# Patient Record
Sex: MALE | Race: WHITE | Employment: FULL TIME | URBAN - METROPOLITAN AREA
[De-identification: names, ages, dates, MRNs, and addresses within clinical notes are randomized per-mention and may not be internally consistent; named-entity substitution may affect disease eponyms.]

---

## 2018-04-04 DIAGNOSIS — E78.5 HYPERLIPIDEMIA, UNSPECIFIED HYPERLIPIDEMIA TYPE: ICD-10-CM

## 2018-04-04 RX ORDER — ATORVASTATIN CALCIUM 40 MG/1
TABLET, FILM COATED ORAL
Qty: 90 TABLET | Refills: 3 | Status: SHIPPED | OUTPATIENT
Start: 2018-04-04 | End: 2018-11-19 | Stop reason: SDUPTHER

## 2018-11-19 ENCOUNTER — HOSPITAL ENCOUNTER (OUTPATIENT)
Age: 58
Discharge: HOME | End: 2018-11-21
Payer: COMMERCIAL

## 2018-11-19 ENCOUNTER — OFFICE VISIT (OUTPATIENT)
Dept: FAMILY MEDICINE CLINIC | Age: 58
End: 2018-11-19
Payer: COMMERCIAL

## 2018-11-19 VITALS
OXYGEN SATURATION: 97 % | HEART RATE: 72 BPM | RESPIRATION RATE: 16 BRPM | TEMPERATURE: 97.7 F | DIASTOLIC BLOOD PRESSURE: 85 MMHG | WEIGHT: 172 LBS | BODY MASS INDEX: 29.37 KG/M2 | SYSTOLIC BLOOD PRESSURE: 122 MMHG | HEIGHT: 64 IN

## 2018-11-19 DIAGNOSIS — E78.5 HYPERLIPIDEMIA, UNSPECIFIED HYPERLIPIDEMIA TYPE: ICD-10-CM

## 2018-11-19 DIAGNOSIS — Z11.59 ENCOUNTER FOR SCREENING FOR OTHER VIRAL DISEASES: Primary | ICD-10-CM

## 2018-11-19 DIAGNOSIS — K21.9 GASTROESOPHAGEAL REFLUX DISEASE WITHOUT ESOPHAGITIS: Chronic | ICD-10-CM

## 2018-11-19 DIAGNOSIS — Z11.59 ENCOUNTER FOR SCREENING FOR OTHER VIRAL DISEASES: ICD-10-CM

## 2018-11-19 LAB
ALBUMIN SERPL-MCNC: 4.3 G/DL (ref 3.5–5.2)
ALP BLD-CCNC: 82 U/L (ref 40–129)
ALT SERPL-CCNC: 52 U/L (ref 0–40)
ANION GAP SERPL CALCULATED.3IONS-SCNC: 14 MMOL/L (ref 7–16)
AST SERPL-CCNC: 31 U/L (ref 0–39)
BILIRUB SERPL-MCNC: 0.6 MG/DL (ref 0–1.2)
BUN BLDV-MCNC: 16 MG/DL (ref 6–20)
CALCIUM SERPL-MCNC: 9.3 MG/DL (ref 8.6–10.2)
CHLORIDE BLD-SCNC: 103 MMOL/L (ref 98–107)
CHOLESTEROL, TOTAL: 210 MG/DL (ref 0–199)
CO2: 25 MMOL/L (ref 22–29)
CREAT SERPL-MCNC: 1 MG/DL (ref 0.7–1.2)
GFR AFRICAN AMERICAN: >60
GFR NON-AFRICAN AMERICAN: >60 ML/MIN/1.73
GLUCOSE BLD-MCNC: 88 MG/DL (ref 74–99)
HDLC SERPL-MCNC: 34 MG/DL
LDL CHOLESTEROL CALCULATED: 102 MG/DL (ref 0–99)
POTASSIUM SERPL-SCNC: 4.4 MMOL/L (ref 3.5–5)
SODIUM BLD-SCNC: 142 MMOL/L (ref 132–146)
TOTAL PROTEIN: 7.3 G/DL (ref 6.4–8.3)
TRIGL SERPL-MCNC: 370 MG/DL (ref 0–149)
VLDLC SERPL CALC-MCNC: 74 MG/DL

## 2018-11-19 PROCEDURE — 99214 OFFICE O/P EST MOD 30 MIN: CPT | Performed by: FAMILY MEDICINE

## 2018-11-19 PROCEDURE — 99212 OFFICE O/P EST SF 10 MIN: CPT | Performed by: FAMILY MEDICINE

## 2018-11-19 PROCEDURE — 80061 LIPID PANEL: CPT

## 2018-11-19 PROCEDURE — 36415 COLL VENOUS BLD VENIPUNCTURE: CPT | Performed by: FAMILY MEDICINE

## 2018-11-19 PROCEDURE — 80053 COMPREHEN METABOLIC PANEL: CPT

## 2018-11-19 PROCEDURE — 86703 HIV-1/HIV-2 1 RESULT ANTBDY: CPT

## 2018-11-19 PROCEDURE — 86803 HEPATITIS C AB TEST: CPT

## 2018-11-19 RX ORDER — ATORVASTATIN CALCIUM 40 MG/1
TABLET, FILM COATED ORAL
Qty: 90 TABLET | Refills: 3 | Status: SHIPPED | OUTPATIENT
Start: 2018-11-19 | End: 2019-03-07 | Stop reason: SDUPTHER

## 2018-11-19 RX ORDER — OMEPRAZOLE 40 MG/1
40 CAPSULE, DELAYED RELEASE ORAL
Qty: 90 CAPSULE | Refills: 3 | Status: SHIPPED | OUTPATIENT
Start: 2018-11-19 | End: 2019-03-07 | Stop reason: SDUPTHER

## 2018-11-19 ASSESSMENT — PATIENT HEALTH QUESTIONNAIRE - PHQ9
SUM OF ALL RESPONSES TO PHQ QUESTIONS 1-9: 0
1. LITTLE INTEREST OR PLEASURE IN DOING THINGS: 0
SUM OF ALL RESPONSES TO PHQ QUESTIONS 1-9: 0
SUM OF ALL RESPONSES TO PHQ9 QUESTIONS 1 & 2: 0
2. FEELING DOWN, DEPRESSED OR HOPELESS: 0

## 2018-11-20 LAB
HEPATITIS C ANTIBODY INTERPRETATION: NORMAL
HIV-1 AND HIV-2 ANTIBODIES: NORMAL

## 2019-03-07 DIAGNOSIS — K21.9 GASTROESOPHAGEAL REFLUX DISEASE WITHOUT ESOPHAGITIS: Chronic | ICD-10-CM

## 2019-03-07 DIAGNOSIS — E78.5 HYPERLIPIDEMIA, UNSPECIFIED HYPERLIPIDEMIA TYPE: ICD-10-CM

## 2019-03-07 RX ORDER — ATORVASTATIN CALCIUM 40 MG/1
TABLET, FILM COATED ORAL
Qty: 90 TABLET | Refills: 3 | Status: SHIPPED
Start: 2019-03-07 | End: 2020-03-23 | Stop reason: SDUPTHER

## 2019-03-07 RX ORDER — OMEPRAZOLE 40 MG/1
40 CAPSULE, DELAYED RELEASE ORAL
Qty: 90 CAPSULE | Refills: 3 | Status: SHIPPED
Start: 2019-03-07 | End: 2020-03-23 | Stop reason: SDUPTHER

## 2019-10-17 ENCOUNTER — OFFICE VISIT (OUTPATIENT)
Dept: FAMILY MEDICINE CLINIC | Age: 59
End: 2019-10-17
Payer: COMMERCIAL

## 2019-10-17 ENCOUNTER — HOSPITAL ENCOUNTER (OUTPATIENT)
Age: 59
Discharge: HOME | End: 2019-10-19
Payer: COMMERCIAL

## 2019-10-17 VITALS
RESPIRATION RATE: 16 BRPM | SYSTOLIC BLOOD PRESSURE: 111 MMHG | BODY MASS INDEX: 27.14 KG/M2 | TEMPERATURE: 97.7 F | HEART RATE: 73 BPM | DIASTOLIC BLOOD PRESSURE: 76 MMHG | HEIGHT: 64 IN | OXYGEN SATURATION: 98 % | WEIGHT: 159 LBS

## 2019-10-17 DIAGNOSIS — E78.5 HYPERLIPIDEMIA, UNSPECIFIED HYPERLIPIDEMIA TYPE: Chronic | ICD-10-CM

## 2019-10-17 DIAGNOSIS — E78.5 HYPERLIPIDEMIA, UNSPECIFIED HYPERLIPIDEMIA TYPE: Primary | Chronic | ICD-10-CM

## 2019-10-17 DIAGNOSIS — R60.9 EDEMA, UNSPECIFIED TYPE: ICD-10-CM

## 2019-10-17 DIAGNOSIS — K21.9 GASTROESOPHAGEAL REFLUX DISEASE WITHOUT ESOPHAGITIS: Chronic | ICD-10-CM

## 2019-10-17 LAB
ALBUMIN SERPL-MCNC: 4.6 G/DL (ref 3.5–5.2)
ALP BLD-CCNC: 81 U/L (ref 40–129)
ALT SERPL-CCNC: 30 U/L (ref 0–40)
ANION GAP SERPL CALCULATED.3IONS-SCNC: 13 MMOL/L (ref 7–16)
AST SERPL-CCNC: 24 U/L (ref 0–39)
BILIRUB SERPL-MCNC: 0.6 MG/DL (ref 0–1.2)
BUN BLDV-MCNC: 21 MG/DL (ref 6–20)
CALCIUM SERPL-MCNC: 9.9 MG/DL (ref 8.6–10.2)
CHLORIDE BLD-SCNC: 102 MMOL/L (ref 98–107)
CHOLESTEROL, TOTAL: 146 MG/DL (ref 0–199)
CO2: 24 MMOL/L (ref 22–29)
CREAT SERPL-MCNC: 1 MG/DL (ref 0.7–1.2)
GFR AFRICAN AMERICAN: >60
GFR NON-AFRICAN AMERICAN: >60 ML/MIN/1.73
GLUCOSE BLD-MCNC: 97 MG/DL (ref 74–99)
HDLC SERPL-MCNC: 28 MG/DL
LDL CHOLESTEROL CALCULATED: 50 MG/DL (ref 0–99)
POTASSIUM SERPL-SCNC: 5.2 MMOL/L (ref 3.5–5)
SODIUM BLD-SCNC: 139 MMOL/L (ref 132–146)
TOTAL PROTEIN: 7.7 G/DL (ref 6.4–8.3)
TRIGL SERPL-MCNC: 339 MG/DL (ref 0–149)
VLDLC SERPL CALC-MCNC: 68 MG/DL

## 2019-10-17 PROCEDURE — 80053 COMPREHEN METABOLIC PANEL: CPT

## 2019-10-17 PROCEDURE — 80061 LIPID PANEL: CPT

## 2019-10-17 PROCEDURE — 99214 OFFICE O/P EST MOD 30 MIN: CPT | Performed by: FAMILY MEDICINE

## 2019-10-17 PROCEDURE — 36415 COLL VENOUS BLD VENIPUNCTURE: CPT

## 2019-10-17 PROCEDURE — 36415 COLL VENOUS BLD VENIPUNCTURE: CPT | Performed by: FAMILY MEDICINE

## 2019-10-17 PROCEDURE — 99212 OFFICE O/P EST SF 10 MIN: CPT | Performed by: FAMILY MEDICINE

## 2019-10-17 ASSESSMENT — PATIENT HEALTH QUESTIONNAIRE - PHQ9
1. LITTLE INTEREST OR PLEASURE IN DOING THINGS: 0
2. FEELING DOWN, DEPRESSED OR HOPELESS: 0
SUM OF ALL RESPONSES TO PHQ9 QUESTIONS 1 & 2: 0
SUM OF ALL RESPONSES TO PHQ QUESTIONS 1-9: 0
SUM OF ALL RESPONSES TO PHQ QUESTIONS 1-9: 0

## 2019-10-18 ENCOUNTER — TELEPHONE (OUTPATIENT)
Dept: FAMILY MEDICINE CLINIC | Age: 59
End: 2019-10-18

## 2019-10-18 DIAGNOSIS — E78.5 HYPERLIPIDEMIA, UNSPECIFIED HYPERLIPIDEMIA TYPE: Primary | Chronic | ICD-10-CM

## 2019-10-23 ENCOUNTER — NURSE ONLY (OUTPATIENT)
Dept: FAMILY MEDICINE CLINIC | Age: 59
End: 2019-10-23
Payer: COMMERCIAL

## 2019-10-23 ENCOUNTER — HOSPITAL ENCOUNTER (OUTPATIENT)
Age: 59
Discharge: HOME | End: 2019-10-25
Payer: COMMERCIAL

## 2019-10-23 DIAGNOSIS — E78.5 HYPERLIPIDEMIA, UNSPECIFIED HYPERLIPIDEMIA TYPE: Primary | ICD-10-CM

## 2019-10-23 DIAGNOSIS — E78.5 HYPERLIPIDEMIA, UNSPECIFIED HYPERLIPIDEMIA TYPE: Chronic | ICD-10-CM

## 2019-10-23 LAB
ALBUMIN SERPL-MCNC: 4.4 G/DL (ref 3.5–5.2)
ALP BLD-CCNC: 96 U/L (ref 40–129)
ALT SERPL-CCNC: 47 U/L (ref 0–40)
ANION GAP SERPL CALCULATED.3IONS-SCNC: 13 MMOL/L (ref 7–16)
AST SERPL-CCNC: 34 U/L (ref 0–39)
BILIRUB SERPL-MCNC: 0.6 MG/DL (ref 0–1.2)
BUN BLDV-MCNC: 24 MG/DL (ref 6–20)
CALCIUM SERPL-MCNC: 10 MG/DL (ref 8.6–10.2)
CHLORIDE BLD-SCNC: 99 MMOL/L (ref 98–107)
CHOLESTEROL, TOTAL: 185 MG/DL (ref 0–199)
CO2: 26 MMOL/L (ref 22–29)
CREAT SERPL-MCNC: 1.2 MG/DL (ref 0.7–1.2)
GFR AFRICAN AMERICAN: >60
GFR NON-AFRICAN AMERICAN: >60 ML/MIN/1.73
GLUCOSE BLD-MCNC: 106 MG/DL (ref 74–99)
HDLC SERPL-MCNC: 28 MG/DL
LDL CHOLESTEROL CALCULATED: ABNORMAL MG/DL (ref 0–99)
POTASSIUM SERPL-SCNC: 5 MMOL/L (ref 3.5–5)
SODIUM BLD-SCNC: 138 MMOL/L (ref 132–146)
TOTAL PROTEIN: 7.7 G/DL (ref 6.4–8.3)
TRIGL SERPL-MCNC: 554 MG/DL (ref 0–149)
VLDLC SERPL CALC-MCNC: ABNORMAL MG/DL

## 2019-10-23 PROCEDURE — 36415 COLL VENOUS BLD VENIPUNCTURE: CPT

## 2019-10-23 PROCEDURE — 80053 COMPREHEN METABOLIC PANEL: CPT

## 2019-10-23 PROCEDURE — 80061 LIPID PANEL: CPT

## 2020-03-23 RX ORDER — ATORVASTATIN CALCIUM 40 MG/1
TABLET, FILM COATED ORAL
Qty: 90 TABLET | Refills: 3 | Status: SHIPPED
Start: 2020-03-23 | End: 2021-04-30 | Stop reason: SDUPTHER

## 2020-03-23 RX ORDER — OMEPRAZOLE 40 MG/1
40 CAPSULE, DELAYED RELEASE ORAL
Qty: 90 CAPSULE | Refills: 3 | Status: SHIPPED
Start: 2020-03-23 | End: 2021-11-22 | Stop reason: SDUPTHER

## 2021-04-30 DIAGNOSIS — E78.5 HYPERLIPIDEMIA, UNSPECIFIED HYPERLIPIDEMIA TYPE: ICD-10-CM

## 2021-04-30 RX ORDER — ATORVASTATIN CALCIUM 40 MG/1
TABLET, FILM COATED ORAL
Qty: 90 TABLET | Refills: 0 | Status: SHIPPED
Start: 2021-04-30 | End: 2021-05-17 | Stop reason: SDUPTHER

## 2021-04-30 NOTE — TELEPHONE ENCOUNTER
Provided a refill as requested. Please make an appointment to follow up with Dr. Melchor Gonzalez for further refills. Thank you!

## 2021-05-17 ENCOUNTER — OFFICE VISIT (OUTPATIENT)
Dept: FAMILY MEDICINE CLINIC | Age: 61
End: 2021-05-17
Payer: COMMERCIAL

## 2021-05-17 VITALS
BODY MASS INDEX: 27.11 KG/M2 | WEIGHT: 153 LBS | HEIGHT: 63 IN | DIASTOLIC BLOOD PRESSURE: 66 MMHG | SYSTOLIC BLOOD PRESSURE: 104 MMHG | OXYGEN SATURATION: 96 % | HEART RATE: 72 BPM | TEMPERATURE: 97.9 F

## 2021-05-17 DIAGNOSIS — E78.5 HYPERLIPIDEMIA, UNSPECIFIED HYPERLIPIDEMIA TYPE: Chronic | ICD-10-CM

## 2021-05-17 DIAGNOSIS — R60.0 LOCALIZED EDEMA: ICD-10-CM

## 2021-05-17 DIAGNOSIS — R60.9 EDEMA, UNSPECIFIED TYPE: ICD-10-CM

## 2021-05-17 DIAGNOSIS — K64.1 GRADE II HEMORRHOIDS: ICD-10-CM

## 2021-05-17 DIAGNOSIS — E78.5 HYPERLIPIDEMIA, UNSPECIFIED HYPERLIPIDEMIA TYPE: Primary | Chronic | ICD-10-CM

## 2021-05-17 DIAGNOSIS — K21.9 GASTROESOPHAGEAL REFLUX DISEASE WITHOUT ESOPHAGITIS: Chronic | ICD-10-CM

## 2021-05-17 LAB
ALBUMIN SERPL-MCNC: 4.1 G/DL (ref 3.5–5.2)
ALP BLD-CCNC: 68 U/L (ref 40–129)
ALT SERPL-CCNC: 20 U/L (ref 0–40)
ANION GAP SERPL CALCULATED.3IONS-SCNC: 15 MMOL/L (ref 7–16)
AST SERPL-CCNC: 21 U/L (ref 0–39)
BILIRUB SERPL-MCNC: 0.6 MG/DL (ref 0–1.2)
BUN BLDV-MCNC: 25 MG/DL (ref 6–23)
CALCIUM SERPL-MCNC: 9.7 MG/DL (ref 8.6–10.2)
CHLORIDE BLD-SCNC: 105 MMOL/L (ref 98–107)
CHOLESTEROL, TOTAL: 111 MG/DL (ref 0–199)
CO2: 25 MMOL/L (ref 22–29)
CREAT SERPL-MCNC: 1.3 MG/DL (ref 0.7–1.2)
GFR AFRICAN AMERICAN: >60
GFR NON-AFRICAN AMERICAN: 56 ML/MIN/1.73
GLUCOSE BLD-MCNC: 95 MG/DL (ref 74–99)
HDLC SERPL-MCNC: 36 MG/DL
LDL CHOLESTEROL CALCULATED: 55 MG/DL (ref 0–99)
POTASSIUM SERPL-SCNC: 4.6 MMOL/L (ref 3.5–5)
SODIUM BLD-SCNC: 145 MMOL/L (ref 132–146)
TOTAL PROTEIN: 6.9 G/DL (ref 6.4–8.3)
TRIGL SERPL-MCNC: 101 MG/DL (ref 0–149)
VLDLC SERPL CALC-MCNC: 20 MG/DL

## 2021-05-17 PROCEDURE — 99212 OFFICE O/P EST SF 10 MIN: CPT | Performed by: FAMILY MEDICINE

## 2021-05-17 PROCEDURE — 99214 OFFICE O/P EST MOD 30 MIN: CPT | Performed by: FAMILY MEDICINE

## 2021-05-17 PROCEDURE — 36415 COLL VENOUS BLD VENIPUNCTURE: CPT | Performed by: FAMILY MEDICINE

## 2021-05-17 RX ORDER — ATORVASTATIN CALCIUM 40 MG/1
TABLET, FILM COATED ORAL
Qty: 90 TABLET | Refills: 2 | Status: SHIPPED
Start: 2021-05-17 | End: 2021-10-14 | Stop reason: SDUPTHER

## 2021-05-17 ASSESSMENT — PATIENT HEALTH QUESTIONNAIRE - PHQ9
2. FEELING DOWN, DEPRESSED OR HOPELESS: 0
SUM OF ALL RESPONSES TO PHQ QUESTIONS 1-9: 0

## 2021-05-17 NOTE — PROGRESS NOTES
breath. He does admit to some reflux symptoms as described above, but denies other bowel or bladder issues. He does have chronic hemorrhoids. Which frequently must be manually reduced. These occasionally bleed but are generally not painful. It has been approximately 6 years since his colonoscopy, and he has not noticed any change in his symptoms. He does find that his hemorrhoids are worse when he is standing or active. Objective:    VS:  Blood pressure 104/66, pulse 72, temperature 97.9 °F (36.6 °C), temperature source Temporal, height 5' 3\" (1.6 m), weight 153 lb (69.4 kg), SpO2 96 %. General Appearance: Well developed, awake, alert, oriented, no acute distress  Neck: Supple, symmetrical, trachea midline. No JVD. Thyroid smooth, not enlarged. Chest wall/Lung: Clear to auscultation bilaterally,  respirations unlabored. No rhonchi/wheezing/rales  Heart[de-identified]  Regular rate and rhythm, S1and S2 normal, no murmur, rub or gallop. Abdomen: Soft, nontender. Normal BS, no hepatosplenomegaly or mass  Extremities:  Extremities normal, atraumatic, no cyanosis. 1+ edema. Skin: Skin color, texture, turgor normal, no rashes or lesions  Musculoskeletal: ROM grossly normal in all joints of extremities, no obvious joint swelling. Neurologic:    Alert, oriented. Normal gait and coordination   No focal neurologic deficits noted. Psychiatric: has a normal mood and affect. Behavior is normal.     I independently reviewed the following information:  none    1. Hyperlipidemia, unspecified hyperlipidemia type  -     Comprehensive Metabolic Panel; Future  -     Lipid Panel; Future  -     atorvastatin (LIPITOR) 40 MG tablet; TAKE 1 TABLET DAILY, Disp-90 tablet, R-2Normal  2. Gastroesophageal reflux disease without esophagitis  3. Edema, unspecified type  -     ECHO 2D WO Color Doppler Complete; Future  4. Grade II hemorrhoids  5. Localized edema      Additional Plan:  We had an extensive discussion regarding his chronic complaints. He will continue on his statin and co-Q10 unless he has any problems. We will obtain a lipid panel and CMP and notify him of those results. I also discussed the potential treatment for his GERD. He has had an EGD a number of years ago and was on omeprazole for many years. He seems to do well with just a nightly dose of baking soda, and has no desire really to switch. I did tell him that he could try an H2 blocker instead of omeprazole. These would likely work faster than omeprazole and last longer than the baking soda. He will consider trying those. Should he have worsening symptoms, I would recommend another EGD. I also discussed the potential treatment for his hemorrhoids. Right now, he does not feel that they are bothering him significantly, and does not desire referral for surgical treatment. He will let me know should he change his mind about that. Finally, we did discuss the causes of ankle edema. He is on no medications that should cause this. I explained that serious causes included heart, kidney, and liver failure. I find no evidence of these problems on his exam, but we will obtain labs that should help rule out liver and kidney disease. He never had an echocardiogram, although I see an order for one in his record from 5 years ago. I will go ahead and reorder that to make sure that we are not missing a subclinical heart failure. I suspect that the cause of his symptoms is simple venous insufficiency. I suggested that he elevate his legs when possible, and consider the use of compression stockings. We did talk about diuretic use, but I do not recommend it at this time. He tells me that if he is reassured that there is no serious cause, he could tolerate the swelling that he has. I did try to reassure him that he does not have any symptoms that strongly suggest multiple sclerosis.     On this date 5/17/2021 I have spent 34 minutes reviewing previous notes, test results and face to face with the patient discussing the diagnosis and importance of compliance with the treatment plan as well as documenting on the day of the visit. RTO in in 6 month(s) or sooner for any persistent, new, or worsening symptoms. Please see Patient Instructions for further counseling and information given. Advised to be adherent to the treatment plans discussed today, and please call with any questions or concerns, letting the office know of any reasons that the plans may not be followed. The risks of untreated conditions include worsening illness, injury, disability, and possibly, death. Please call if symptoms change in any way, worsen, or fail to completely resolve, as this could necessitate a change to treatment plans. Patient and/or caregiver expressed understanding. Indications and proper use of medication(s) reviewed. Potential side-effects and risks of medication(s) also explained. Patient and/or caregiver was instructed to call if any new symptoms develop prior to next visit. Health risk factors discussed and addressed.     Signed by : Rachele Hammond MD, M.D.

## 2021-06-23 ENCOUNTER — HOSPITAL ENCOUNTER (OUTPATIENT)
Dept: NON INVASIVE DIAGNOSTICS | Age: 61
Discharge: HOME | End: 2021-06-23
Payer: COMMERCIAL

## 2021-06-23 DIAGNOSIS — R60.9 EDEMA, UNSPECIFIED TYPE: ICD-10-CM

## 2021-06-23 LAB
LV EF: 60 %
LVEF MODALITY: NORMAL

## 2021-06-23 PROCEDURE — 93306 TTE W/DOPPLER COMPLETE: CPT

## 2021-10-06 ENCOUNTER — NURSE TRIAGE (OUTPATIENT)
Dept: OTHER | Facility: CLINIC | Age: 61
End: 2021-10-06

## 2021-10-06 DIAGNOSIS — R60.0 LOCALIZED EDEMA: ICD-10-CM

## 2021-10-06 DIAGNOSIS — E78.5 HYPERLIPIDEMIA, UNSPECIFIED HYPERLIPIDEMIA TYPE: Primary | ICD-10-CM

## 2021-10-06 DIAGNOSIS — G62.9 PERIPHERAL POLYNEUROPATHY: ICD-10-CM

## 2021-10-06 NOTE — TELEPHONE ENCOUNTER
Call patient. I am going to order some lab tests for this. He could get these first, and then come in for visit. If he wants early visit, he could see anyone in this office.
Message left informing lab work ordered and provided instruction to obtain prior to appt
last menstrual period? \"      N/A    Protocols used: NEUROLOGIC DEFICIT-ADULT-[place]

## 2021-10-07 ENCOUNTER — NURSE ONLY (OUTPATIENT)
Dept: FAMILY MEDICINE CLINIC | Age: 61
End: 2021-10-07
Payer: COMMERCIAL

## 2021-10-07 DIAGNOSIS — R60.0 LOCALIZED EDEMA: ICD-10-CM

## 2021-10-07 DIAGNOSIS — E78.5 HYPERLIPIDEMIA, UNSPECIFIED HYPERLIPIDEMIA TYPE: Chronic | ICD-10-CM

## 2021-10-07 PROCEDURE — 36415 COLL VENOUS BLD VENIPUNCTURE: CPT | Performed by: FAMILY MEDICINE

## 2021-10-07 NOTE — PROGRESS NOTES
Blood work drawn from left [facility], tolerated well. Site clean band aid placed.

## 2021-10-14 ENCOUNTER — OFFICE VISIT (OUTPATIENT)
Dept: FAMILY MEDICINE CLINIC | Age: 61
End: 2021-10-14
Payer: COMMERCIAL

## 2021-10-14 VITALS
HEART RATE: 79 BPM | BODY MASS INDEX: 27.29 KG/M2 | HEIGHT: 63 IN | DIASTOLIC BLOOD PRESSURE: 81 MMHG | SYSTOLIC BLOOD PRESSURE: 127 MMHG | OXYGEN SATURATION: 96 % | WEIGHT: 154 LBS | TEMPERATURE: 98.4 F

## 2021-10-14 DIAGNOSIS — E03.8 SUBCLINICAL HYPOTHYROIDISM: ICD-10-CM

## 2021-10-14 DIAGNOSIS — R89.9 ABNORMAL LABORATORY TEST: Primary | ICD-10-CM

## 2021-10-14 DIAGNOSIS — Z23 NEED FOR INFLUENZA VACCINATION: ICD-10-CM

## 2021-10-14 DIAGNOSIS — R20.2 PARESTHESIAS: ICD-10-CM

## 2021-10-14 DIAGNOSIS — E78.5 HYPERLIPIDEMIA, UNSPECIFIED HYPERLIPIDEMIA TYPE: Chronic | ICD-10-CM

## 2021-10-14 DIAGNOSIS — R60.0 LOCALIZED EDEMA: ICD-10-CM

## 2021-10-14 DIAGNOSIS — K21.9 GASTROESOPHAGEAL REFLUX DISEASE WITHOUT ESOPHAGITIS: ICD-10-CM

## 2021-10-14 PROCEDURE — 36415 COLL VENOUS BLD VENIPUNCTURE: CPT | Performed by: FAMILY MEDICINE

## 2021-10-14 PROCEDURE — 99214 OFFICE O/P EST MOD 30 MIN: CPT | Performed by: FAMILY MEDICINE

## 2021-10-14 PROCEDURE — 99212 OFFICE O/P EST SF 10 MIN: CPT | Performed by: FAMILY MEDICINE

## 2021-10-14 PROCEDURE — G0008 ADMIN INFLUENZA VIRUS VAC: HCPCS

## 2021-10-14 PROCEDURE — 6360000002 HC RX W HCPCS

## 2021-10-14 PROCEDURE — 90694 VACC AIIV4 NO PRSRV 0.5ML IM: CPT

## 2021-10-14 RX ORDER — ATORVASTATIN CALCIUM 40 MG/1
TABLET, FILM COATED ORAL
Qty: 90 TABLET | Refills: 2 | Status: SHIPPED | OUTPATIENT
Start: 2021-10-14

## 2021-10-14 RX ORDER — FUROSEMIDE 20 MG/1
20 TABLET ORAL DAILY
Qty: 30 TABLET | Refills: 5 | Status: SHIPPED | OUTPATIENT
Start: 2021-10-14

## 2021-10-14 SDOH — ECONOMIC STABILITY: FOOD INSECURITY: WITHIN THE PAST 12 MONTHS, YOU WORRIED THAT YOUR FOOD WOULD RUN OUT BEFORE YOU GOT MONEY TO BUY MORE.: NEVER TRUE

## 2021-10-14 SDOH — ECONOMIC STABILITY: FOOD INSECURITY: WITHIN THE PAST 12 MONTHS, THE FOOD YOU BOUGHT JUST DIDN'T LAST AND YOU DIDN'T HAVE MONEY TO GET MORE.: NEVER TRUE

## 2021-10-14 ASSESSMENT — SOCIAL DETERMINANTS OF HEALTH (SDOH): HOW HARD IS IT FOR YOU TO PAY FOR THE VERY BASICS LIKE FOOD, HOUSING, MEDICAL CARE, AND HEATING?: NOT HARD AT ALL

## 2021-10-14 ASSESSMENT — LIFESTYLE VARIABLES: HOW OFTEN DO YOU HAVE A DRINK CONTAINING ALCOHOL: NEVER

## 2021-10-14 NOTE — PROGRESS NOTES
Vaccine Information Sheet, \"Influenza - Inactivated\"  given to Lenore Azevedo, or parent/legal guardian of  Lenore Azevedo and verbalized understanding. Patient responses:    Have you ever had a reaction to a flu vaccine? No  Do you have any current illness? No  Have you ever had Guillian Moody Syndrome? No  Do you have a serious allergy to any of the follow: Neomycin, Polymyxin, Thimerosal, eggs or egg products? No    Flu vaccine given per order. Please see immunization tab. Risks and benefits explained. Current VIS given.       Immunizations Administered     Name Date Dose Route    Influenza, Quadv, IM, PF (6 mo and older Fluzone, Flulaval, Fluarix, and 3 yrs and older Afluria) 10/14/2021 0.5 mL Intramuscular    Site: Deltoid- Left    Lot: [id number]    NDC: 47849-156-32

## 2021-10-14 NOTE — PROGRESS NOTES
[facility]  FAMILY MEDICINE RESIDENCY PROGRAM   OFFICE PROGRESS NOTE  DATE OF VISIT : 10/14/2021         Chief Complaint :   Chief Complaint   Patient presents with    Numbness     and tingling in bilateral feet for the past six months       HPI:   Sulema Angela comes to clinic today for     F/U of chronic problem(s) and new or recent complaint of Paresthesias of the feet     Chronic problems reviewed today include:     Hyperlipidemia and GERD, lower extremity edema. Current status of this/these condition(s):  unstable    Tolerating meds: Yes    Additional history: Opal Hart comes in today for follow-up of some of his chronic problems and review of recent laboratory testing. In addition, he reports a new condition. He actually tells me that he has had some numbness in his feet for months but did not tell me about this previously. He did see me for some swelling in his feet sometime ago, and I felt that this was likely benign venous insufficiency. This seems to have improved somewhat over the summer, but he is concerned that it may get worse again now that winter is coming. His new symptom relates to some hypersensitivity of the soles of his feet. This is mostly in the balls and toes of his feet but he states that when he walks at any time he is extremely sensitive and it is almost painful. When he is not walking he feels a sense of numbness of the same areas. It started out somewhat intermittent, but now seems to be continuous. He denies any change with activity or anything else that he has been able to find. He continues to have GERD symptoms almost daily. Because of concerns about potential risks of taking continue his PPI, he stopped his omeprazole. Instead, he now takes baking soda every evening when his symptoms develop. He states this relieves his symptoms almost immediately, but he may require a second dose depending on his eating habits.   His recent laboratory tests show that his A1c was 4.8 and his B12 level was normal.  His triglycerides were significantly elevated, and his fasting sugar was in the low 100 range. The other abnormality was a mildly elevated TSH at 8.3. He did review these test results on his my chart, and his wife did a lot of reading in regards to these. She had multiple questions written down for him to ask. Objective:    Vitals: /81   Pulse 79   Temp 98.4 °F (36.9 °C) (Temporal)   Ht 5' 3\" (1.6 m)   Wt 154 lb (69.9 kg)   SpO2 96%   BMI 27.28 kg/m²   General Appearance: Well developed, awake, alert, oriented, and in NAD  HEENT: Normocephalic,atraumatic. PERRL, EOM's intact, EAC without erythema or swelling, Normal TM's bilaterally, no pallor or icterus. Neck: Supple, symmetrical, trachea midline. No JVD. Thyroid smooth, not enlarged. Chest wall/Lung: Clear to auscultation bilaterally,  respirations unlabored. No rhonchi/wheezing/rales  Heart: Regular rate and rhythm, S1and S2 normal, no murmur, rub or gallop. Abdomen: Soft, nontender. Normal BS, no hepatosplenomegaly or mass  Extremities:  Extremities normal, atraumatic, no cyanosis. 2+ edema. Peripheral pulses are symmetric and normal.  His feet are slightly cool but normal capillary refill. He has normal strength and normal sensation with light touch and monofilament exam.  Skin: Skin color, texture, turgor normal, no rashes or lesions  Musculoskeletal: ROM grossly normal in all joints of extremities, no obvious joint swelling. Lymph nodes: No lymph node enlargement appreciated  Neurologic: Alert, oriented. Moves all 4 limbs. Sensation grossly intact. Psychiatric: has a normal mood and affect. Behavior is normal.     I independently reviewed the following information:  historical medical records and lab reports    1. Abnormal laboratory test  -     T4, Free; Future  -     TSH without Reflex; Future  2.  Hyperlipidemia, unspecified hyperlipidemia type  -     atorvastatin (LIPITOR) 40 MG tablet; TAKE 1 TABLET DAILY, Disp-90 tablet, R-2Normal  3. Gastroesophageal reflux disease without esophagitis  4. Localized edema  -     furosemide (LASIX) 20 MG tablet; Take 1 tablet by mouth daily, Disp-30 tablet, R-5Normal  5. Paresthesias  6. Subclinical hypothyroidism  -     T4, Free; Future  -     TSH without Reflex; Future  7. Need for influenza vaccination  -     INFLUENZA, QUADV, 3 YRS AND OLDER, IM PF, PREFILL SYR OR SDV, 0.5ML (AFLURIA QUADV, PF)      Additional Plan:    I reviewed all of his laboratory tests with him, and answered all the questions that his wife had written for him to ask. He has a number of concerns in regards to his health. His father had multiple sclerosis and he wonders if this is a potential cause. His wife has thyroid disease and she has not numerous questions were relating to his abnormal thyroid tests. He also had questions about taking aspirin daily and about his GERD treatment. I advised him of the latest information regarding daily aspirin as primary prevention. I explained that this was now felt to be higher risk than the benefit obtained. In his case, particularly with his continuous GERD symptoms, I think that he would be safe for stopping. He is agreed to this. In regards to his GERD, I have advised that he should restart his omeprazole for 1 month. He had excellent relief with this treatment in the past.  After 1 month, he may then attempt to drop down to an H2 blocker. I have suggested famotidine. If you would prefer to use this on a as needed basis that would be acceptable. While I do not believe that his baking soda is harmful at the current dose, I suspect he would benefit from using an acid reducer, as opposed to just an acid buffer. I still find no evidence of any serious cause for his bilateral lower extremity edema. He has agreed to try compression stockings, but would like to try a low-dose diuretic as well.   I have agreed to provide him with understanding. Indications and proper use of medication(s) reviewed. Potential side-effects and risks of medication(s) also explained. Patient and/or caregiver was instructed to call if any new symptoms develop prior to next visit. Health risk factors discussed and addressed.     Signed by : Erik Grant MD, M.D.

## 2021-10-15 DIAGNOSIS — E78.2 MIXED HYPERLIPIDEMIA: Primary | ICD-10-CM

## 2021-11-16 ENCOUNTER — TELEPHONE (OUTPATIENT)
Dept: FAMILY MEDICINE CLINIC | Age: 61
End: 2021-11-16

## 2021-11-16 DIAGNOSIS — R20.2 PARESTHESIAS: Primary | ICD-10-CM

## 2021-11-16 NOTE — TELEPHONE ENCOUNTER
The patient called to report that his numbness is still present, better than it was , but still present. He is inquiring if needs to see a specialist or not.    Please advise

## 2021-11-16 NOTE — TELEPHONE ENCOUNTER
While I don't think there is any significant medical condition causing his symptoms, I will refer to neurology for a second opinion and to see if they have any suggestions for treatment. I made the referral today.

## 2021-11-22 DIAGNOSIS — K21.9 GASTROESOPHAGEAL REFLUX DISEASE WITHOUT ESOPHAGITIS: Chronic | ICD-10-CM

## 2021-11-22 RX ORDER — OMEPRAZOLE 40 MG/1
40 CAPSULE, DELAYED RELEASE ORAL
Qty: 90 CAPSULE | Refills: 3 | Status: SHIPPED | OUTPATIENT
Start: 2021-11-22

## 2021-11-22 NOTE — TELEPHONE ENCOUNTER
The patient called and states he is in need of an EMG prior to seeing Neurology   Please order EMG is applicable

## 2021-11-23 ENCOUNTER — TELEPHONE (OUTPATIENT)
Dept: FAMILY MEDICINE CLINIC | Age: 61
End: 2021-11-23

## 2021-11-23 DIAGNOSIS — R20.2 PARESTHESIAS: Primary | ICD-10-CM

## 2021-11-23 NOTE — TELEPHONE ENCOUNTER
Message left on voicemail advising that the order is in and should be receiving a call to schedule that test soon, requested a return call if he has any questions or concerns.

## 2021-12-02 ENCOUNTER — TELEPHONE (OUTPATIENT)
Dept: FAMILY MEDICINE CLINIC | Age: 61
End: 2021-12-02

## 2021-12-02 DIAGNOSIS — R20.2 PARESTHESIAS: Primary | ICD-10-CM

## 2021-12-02 NOTE — TELEPHONE ENCOUNTER
I am placing a referral to [facility]. Please let patient know that these appointments are quite difficult to get, so it may not matter which provider I choose.   If he finds someone that can see him sooner, I would be happy to make referral.

## 2021-12-02 NOTE — TELEPHONE ENCOUNTER
Patient advised and verbalized understanding.   He was wondering if you have a chance to call him to discuss alternatives such as a neuropathy center vs seeing a neurologist.

## 2021-12-02 NOTE — TELEPHONE ENCOUNTER
The patient called to let you know he has his emg scheduled for 12/7   he states he needs a different referral to a different neurologist because he wants seen in December, and the referral we placed cannot accomodate that request.   The patient reports he has \"been waiting for quite some time for these appointments. \"  I told the patient that I would ask you to place a new referral for a different neurologist but appointments to specialists may take longer than a primary doctor.

## 2021-12-03 ENCOUNTER — TELEPHONE (OUTPATIENT)
Dept: FAMILY MEDICINE CLINIC | Age: 61
End: 2021-12-03

## 2021-12-03 NOTE — TELEPHONE ENCOUNTER
----- Message from Tiarra Klipfolio sent at 12/2/2021 10:48 AM EST -----  Subject: Refill Request    QUESTIONS  Name of Medication? omeprazole (PRILOSEC) 40 MG delayed release capsule  Patient-reported dosage and instructions? 1 per day  How many days do you have left? 0  Preferred Pharmacy? [address] phone number (if available)? [phone]  ---------------------------------------------------------------------------  --------------  CALL BACK INFO  What is the best way for the office to contact you? OK to leave message on   voicemail  Preferred Call Back Phone Number?  [phone]

## 2021-12-03 NOTE — TELEPHONE ENCOUNTER
Message left informing Rx was sent last month.  Requested he check with pharmacy and call back for problems

## 2021-12-07 ENCOUNTER — HOSPITAL ENCOUNTER (OUTPATIENT)
Dept: NEUROLOGY | Age: 61
Discharge: HOME | End: 2021-12-07
Payer: COMMERCIAL

## 2021-12-07 VITALS — HEIGHT: 63 IN | WEIGHT: 155 LBS | BODY MASS INDEX: 27.46 KG/M2

## 2021-12-07 DIAGNOSIS — R20.2 PARESTHESIAS: ICD-10-CM

## 2021-12-07 PROCEDURE — 95913 NRV CNDJ TEST 13/> STUDIES: CPT

## 2021-12-07 NOTE — PROGRESS NOTES
Nerve conduction studies completed. EMG (needle exam) was scheduled for 12/10 d/t Dr yahaira Centeno 12/7/2021

## 2021-12-10 ENCOUNTER — HOSPITAL ENCOUNTER (OUTPATIENT)
Dept: NEUROLOGY | Age: 61
Discharge: HOME | End: 2021-12-10
Payer: COMMERCIAL

## 2021-12-10 VITALS — WEIGHT: 155 LBS | BODY MASS INDEX: 27.46 KG/M2 | HEIGHT: 63 IN

## 2021-12-10 PROCEDURE — 95886 MUSC TEST DONE W/N TEST COMP: CPT | Performed by: PSYCHIATRY & NEUROLOGY

## 2021-12-10 PROCEDURE — 95907 NVR CNDJ TST 1-2 STUDIES: CPT

## 2021-12-10 PROCEDURE — 95913 NRV CNDJ TEST 13/> STUDIES: CPT | Performed by: PSYCHIATRY & NEUROLOGY

## 2021-12-10 PROCEDURE — 95886 MUSC TEST DONE W/N TEST COMP: CPT

## 2021-12-10 NOTE — PROCEDURES
[facility]   Electrodiagnostic Laboratory  [place]        Full Name: Sonia Santos Gender: Male  MRN: [de-identified] YOB: 1960  Location[de-identified] Outpt. Visit Date: 12/7/2021 08:01  Age: 64 Years 11 Months Old  Examining Physician: Dr. Cortez Lowe  Referring Physician: Dr. Terence Mixon  Technician: Krzysztof Trevino   Height: 5 feet 3 inch  Weight: 155 lbs; BMI 27.5  Notes: Paresthesias R20.2        Motor NCS      Nerve / Sites Lat. Amplitude Distance Lat Diff Velocity Temp. Amp. 1-2    ms mV cm ms m/s °C %   L Median - APB      Wrist 4.06 4.2 8   32.5 100      Elbow 9.22 2.9 20 5.16 39 32.5 70.4   L Ulnar - ADM      Wrist 3.96 5.9 8   32.5 100      B. Elbow 8.49 3.9 20 4.53 44 32.4 66.8      A. Elbow 11.09 3.9 10 2.60 38 32.1 66.6   L Peroneal - EDB      Ankle 5.47 0.7 8   31.9 100      Pop fossa 16.20 0.4 34 10.73 32 31.9 59.4   R Peroneal - EDB      Ankle 5.57 0.4 8   31.3 100      Pop fossa 19.64 0.3 34 14.06 24 31.5 69.5   R Tibial - AH      Ankle 7.19 0.3 8   32.5 100      Pop fossa 25.99 0.1 40 18.80 21 32.5 35.8   L Tibial - AH      Ankle 7.76 0.1 8   32 100      Pop fossa 25.26 0.1 38 17.50 22 32.1 73.6       Sensory NCS      Nerve / Sites Onset Lat Peak Lat PP Amp Distance Velocity Temp.    ms ms µV cm m/s °C   L Median - Digit II (Antidromic)      Mid Palm 1.82 2.50 21.1 7 38 32.2      Wrist 3.96 4.43 20.2 14 35 32.2   L Ulnar - Digit V (Antidromic)      Wrist 3.54 4.22 4.4 14 40 32.2   L Radial - Anatomical snuff box (Forearm)      Forearm 2.50 3.39 8.4 10 40 32.1   R Superficial peroneal - Ankle      Lat leg NR NR NR 10 NR 31.5   L Superficial peroneal - Ankle      Lat leg 3.54 4.43 4.8 10 28 31.6   R Sural - Ankle (Calf)      Calf NR NR NR 14 NR 32.4   L Sural - Ankle (Calf)      Calf NR NR NR 14 NR 32.2       F  Wave      Nerve F Lat M Lat F-M Lat    ms ms ms   L Peroneal - EDB 79.0 8.0 71.0   R Peroneal - EDB NR NR NR   R Tibial - AH NR NR NR   L Tibial - AH NR NR NR   L Median - APB 35.7 4.4 31.4   L Ulnar - ADM 40.7 4.2 36.5       H Reflex      Nerve Lat Hmax    ms   R Tibial - Soleus 45.5   L Tibial - Soleus NR           Motor NCS      Nerve / Sites Lat. Amplitude Temp. Amp. 1-2    ms mV °C %   L Femoral - Vastus Med      B. Ing Lig 6.35 4.7 33.4 100   R Femoral - Vastus Med      B. Ing Lig 6.51 3.7 33.4 100       EMG         EMG Summary Table     Spontaneous MUAP Recruitment   Muscle IA Fib PSW Fasc H.F. Amp Dur. PPP Pattern   L. Tibialis anterior Incr Few 1+ None None N 2+ 2+ Sl Decr   L. Gastrocnemius (Medial head) Incr 1+ 1+ None None N 2+ 1+ Sl Decr   L. Flexor digitorum longus Incr Few Few None None N 2+ 1+ Decr   L. Extensor hallucis longus Incr None 1+ Rare None N N 2+ Sl Decr   L. Dorsal interossei (pedis) N None None None None N N N Distant MUPs   L. Extensor digitorum brevis Incr Few Few None None N 2+ 2+ Decr   L. Vastus lateralis N None None None None N N N N   L. Biceps femoris (short head) Incr 1+ 2+ None None N 2+ 2+ N   L. Gluteus medius Inc/DNT None None None None N 2+ 1+ N   L. Iliopsoas N None None None None N N N N   L. Sacral paraspinals Inc/DNT None None None None N N N N   L. Lumbar paraspinals (low) Incr Few Few None None N N N N   L. Lumbar paraspinals (mid) N None None None None N N N N           Nerve conduction studies in the left arm found slightly decreased motor conduction velocities in the left median and ulnar nerves. The left median palmar and distal sensory latencies were prolonged, with decreased antidromic and orthodromic velocities. The left median and ulnar F-wave responses were slow. Nerve testings in both legs displayed markedly decreased compound motor action potentials in both peroneal and tibial nerves, recording over the foot intrinsics. The distal motor latencies of these nerves were also delayed, with decreased motor conduction velocities. There was questionable motor conduction block in the left peroneal and both sural nerves.      The

## 2021-12-13 ENCOUNTER — TELEPHONE (OUTPATIENT)
Dept: FAMILY MEDICINE CLINIC | Age: 61
End: 2021-12-13

## 2021-12-13 DIAGNOSIS — M54.17 LUMBOSACRAL RADICULOPATHY: Primary | ICD-10-CM

## 2021-12-13 NOTE — Clinical Note
Please call patient and let him know that I have ordered an MRI of his low back after talking with the neurologist.  They should call him with a time.

## 2021-12-23 ENCOUNTER — TELEPHONE (OUTPATIENT)
Dept: FAMILY MEDICINE CLINIC | Age: 61
End: 2021-12-23

## 2021-12-23 NOTE — TELEPHONE ENCOUNTER
I called to get preauthorization on MRI. I called Jean and received authorization. #[id number]. He had tried NSAIDs as well as lasix for symptoms for more than 6 weeks. I spoke with patient as well  He saw neurologist yesterday and feels like he will be dealing with this issue for the rest of his life. I offered to assist with any further issues prior to his new physician seeing him. 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

## 2021-12-27 ENCOUNTER — HOSPITAL ENCOUNTER (OUTPATIENT)
Age: 61
Discharge: HOME | End: 2021-12-27
Payer: COMMERCIAL

## 2021-12-27 PROCEDURE — 36415 COLL VENOUS BLD VENIPUNCTURE: CPT

## 2021-12-27 PROCEDURE — 82595 ASSAY OF CRYOGLOBULIN: CPT

## 2022-01-03 LAB — CRYOGLOBULIN: NEGATIVE

## 2022-01-06 ENCOUNTER — TELEPHONE (OUTPATIENT)
Dept: FAMILY MEDICINE CLINIC | Age: 62
End: 2022-01-06